# Patient Record
Sex: FEMALE | Race: WHITE | Employment: FULL TIME | URBAN - METROPOLITAN AREA
[De-identification: names, ages, dates, MRNs, and addresses within clinical notes are randomized per-mention and may not be internally consistent; named-entity substitution may affect disease eponyms.]

---

## 2018-07-11 ENCOUNTER — OFFICE VISIT (OUTPATIENT)
Dept: SURGERY | Age: 28
End: 2018-07-11

## 2018-07-11 VITALS
WEIGHT: 293 LBS | BODY MASS INDEX: 48.82 KG/M2 | HEART RATE: 72 BPM | RESPIRATION RATE: 18 BRPM | TEMPERATURE: 98.1 F | OXYGEN SATURATION: 97 % | HEIGHT: 65 IN

## 2018-07-11 DIAGNOSIS — K80.20 GALLSTONES: Primary | ICD-10-CM

## 2018-07-11 RX ORDER — CEFAZOLIN SODIUM 1 G/3ML
2 INJECTION, POWDER, FOR SOLUTION INTRAMUSCULAR; INTRAVENOUS
Status: CANCELLED | OUTPATIENT
Start: 2018-07-11 | End: 2018-07-11

## 2018-07-11 RX ORDER — OXYCODONE AND ACETAMINOPHEN 5; 325 MG/1; MG/1
TABLET ORAL
COMMUNITY

## 2018-07-11 NOTE — PERIOP NOTES
[facility] Preoperative Instructions Surgery Date 7/12/18          Time of Arrival 1215 1. On the day of your surgery, please report to the Surgical Services Registration Desk and sign in at your designated time. The Surgery Center is located to the right of the Emergency Room. 2. You must have someone with you to drive you home. You should not drive a car for 24 hours following surgery. Please make arrangements for a friend or family member to stay with you for the first 24 hours after your surgery. 3. Do not have anything to eat or drink (including water, gum, mints, coffee, juice) after midnight 7/11/18?? Ej Bonilla ? This may not apply to medications prescribed by your physician. ?(Please note below the special instructions with medications to take the morning of your procedure.) 4. We recommend you do not drink any alcoholic beverages for 24 hours before and after your surgery. 5. Contact your surgeons office for instructions on the following medications: non-steroidal anti-inflammatory drugs (i.e. Advil, Aleve), vitamins, and supplements. (Some surgeons will want you to stop these medications prior to surgery and others may allow you to take them) **If you are currently taking Plavix, Coumadin, Aspirin and/or other blood-thinning agents, contact your surgeon for instructions. ** Your surgeon will partner with the physician prescribing these medications to determine if it is safe to stop or if you need to continue taking. Please do not stop taking these medications without instructions from your surgeon 6. Wear comfortable clothes. Wear glasses instead of contacts. Do not bring any money or jewelry. Please bring picture ID, insurance card, and any prearranged co-payment or hospital payment. Do not wear make-up, particularly mascara the morning of your surgery. Do not wear nail polish, particularly if you are having foot /hand surgery.   Wear your hair loose or down, no ponytails, buns, calvin pins or clips. All body piercings must be removed. Please shower with antibacterial soap for three consecutive days before and on the morning of surgery, but do not apply any lotions, powders or deodorants after the shower on the day of surgery. Please use a fresh towels after each shower. Please sleep in clean clothes and change bed linens the night before surgery. Please do not shave for 48 hours prior to surgery. Shaving of the face is acceptable. 7. You should understand that if you do not follow these instructions your surgery may be cancelled. If your physical condition changes (I.e. fever, cold or flu) please contact your surgeon as soon as possible. 8. It is important that you be on time. If a situation occurs where you may be late, please call [phone] (OR Holding Area). 9. If you have any questions and or problems, please call [phone] (Pre-admission Testing). 10. Your surgery time may be subject to change. You will receive a phone call the evening prior if your time changes. 11.  If having outpatient surgery, you must have someone to drive you here, stay with you during the duration of your stay, and to drive you home at time of discharge. 12.   In an effort to improve the efficiency, privacy, and safety for all of our Pre-op patients visitors are not allowed in the Holding area. Once you arrive and are registered your family/visitors will be asked to remain in the waiting room. The Pre-op staff will get you from the Surgical Waiting Area and will explain to you and your family/visitors that the Pre-op phase is beginning. The staff will answer any questions and provide instructions for tracking of the patient, by use of the existing tracking number and color-coded status board in the waiting room.   At this time the staff will also ask for your designated spokesperson information in the event that the physician or staff need to provide an update or obtain any pertinent information. The designated spokesperson will be notified if the physician needs to speak to family during the pre-operative phase. If at any time your family/visitors has questions or concerns they may approach the volunteer desk in the waiting area for assistance. Special Instructions:none MEDICATIONS TO TAKE THE MORNING OF SURGERY WITH A SIP OF WATER:percocet if needed. I understand a pre-operative phone call will be made to verify my surgery time. In the event that I am not available, I give permission for a message to be left on my answering service and/or with another person? {yes @ [phone] 
 
 
 
 ___________________      __________   _________ 
  (Signature of Patient)             (Witness)                (Date and Time)

## 2018-07-11 NOTE — PROGRESS NOTES
Rinku King is a 34 y.o. female    Chief Complaint   Patient presents with    New Patient     Roseline NENITA     1. Have you been to the ER, urgent care clinic since your last visit? Hospitalized since your last visit? Yes Where: [address] [facility]    2. Have you seen or consulted any other health care providers outside of the [facility] since your last visit? Include any pap smears or colon screening.  No

## 2018-07-11 NOTE — MR AVS SNAPSHOT
[address], [address] [facility], [address] [place] [address] [facility] 
[phone] Patient: Denita Alvarenga MRN: [id number] TBL:[id number] Visit Information Date & Time Provider Department Dept. Phone Encounter #  
 7/11/2018 10:40 AM Lyssa Strange MD [facility] [id number] Your Appointments 7/30/2018  3:00 PM  
POST OP with Lyssa Strange MD  
[facility] Patrick Mosley [facility] ([address]) Appt Note: po- lap carol 7/16/18  
 [address] [facility], [address] [facility], [address] [facility] [address] [address]  
[address], [address] [facility], [address] [place] [address] [address] [address] Upcoming Health Maintenance Date Due  
 HPV Age 9Y-34Y (1 of 3 - Female 3 Dose Series) 1/10/2003 DTaP/Tdap/Td series (1 - Tdap) 1/10/2013 PAP AKA CERVICAL CYTOLOGY 1/10/2013 Influenza Age 5 to Adult 8/1/2018 Allergies as of 7/11/2018  Review Complete On: 7/11/2018 By: David Acosta RN Severity Noted Reaction Type Reactions Sulfa (Sulfonamide Antibiotics) High 07/11/2018    Anaphylaxis Current Immunizations  Never Reviewed No immunizations on file. Not reviewed this visit You Were Diagnosed With   
  
 Codes Comments Gallstones    -  Primary ICD-10-CM: O10.67 ICD-9-CM: 574.20 Vitals Pulse Temp Resp Height(growth percentile) Weight(growth percentile) SpO2  
 72 98.1 °F (36.7 °C) (Oral) 18 5' 5\" (1.651 m) 296 lb (134.3 kg) 97% BMI OB Status Smoking Status 49.26 kg/m2 Implant Never Smoker Vitals History BMI and BSA Data Body Mass Index Body Surface Area  
 49.26 kg/m 2 2.48 m 2 Your Updated Medication List  
  
   
This list is accurate as of 7/11/18  2:24 PM.  Always use your most recent med list.  
  
  
  
  
 PERCOCET 5-325 mg per tablet Generic drug:  oxyCODONE-acetaminophen Take  by mouth every four (4) hours as needed for Pain. Introducing [facility]! Jluis Cochran introduces Pict patient portal. Now you can access parts of your medical record, email your doctor's office, and request medication refills online. 1. In your internet browser, go to [email or web address] 2. Click on the First Time User? Click Here link in the Sign In box. You will see the New Member Sign Up page. 3. Enter your Pict Access Code exactly as it appears below. You will not need to use this code after youve completed the sign-up process. If you do not sign up before the expiration date, you must request a new code. · Pict Access Code: [email or web address] Expires: 10/9/2018 10:33 AM 
 
4. Enter the last four digits of your Social Security Number (xxxx) and Date of Birth (mm/dd/yyyy) as indicated and click Submit. You will be taken to the next sign-up page. 5. Create a Pict ID. This will be your Pict login ID and cannot be changed, so think of one that is secure and easy to remember. 6. Create a Pict password. You can change your password at any time. 7. Enter your Password Reset Question and Answer. This can be used at a later time if you forget your password. 8. Enter your e-mail address. You will receive e-mail notification when new information is available in [address] 9. Click Sign Up. You can now view and download portions of your medical record. 10. Click the Download Summary menu link to download a portable copy of your medical information. If you have questions, please visit the Frequently Asked Questions section of the Pict website. Remember, Pict is NOT to be used for urgent needs. For medical emergencies, dial 911. Now available from your iPhone and Android! Please provide this summary of care documentation to your next provider. Your primary care clinician is listed as Lashonda Costa.  If you have any questions after today's visit, please call [phone].

## 2018-07-11 NOTE — PROGRESS NOTES
Surgery Consult:  gallstones  Requesting physician:  Self referred    Subjective:   Patient 34 y.o.  female presents with 2 day history of constant \"pressure\" like epigastric pain radiating to the back with nausea and vomiting. No obvious palliative factors, but reports that her pain is worse with eating. No F/C/S. Patient was seen at [address] [place] [address] and underwent US which showed cholelithiasis with positive Chaves's sign. Labs were done but currently not available. No history of jaundice or pancreatitis. No prior history of biliary colic. Patient reports having constipation recently. Past Medical & Surgical History:  History reviewed. No pertinent past medical history. History reviewed. No pertinent surgical history. Social History:  Social History     Social History    Marital status: SINGLE     Spouse name: N/A    Number of children: N/A    Years of education: N/A     Occupational History    Not on file. Social History Main Topics    Smoking status: Never Smoker    Smokeless tobacco: Never Used    Alcohol use No    Drug use: No    Sexual activity: No     Other Topics Concern    Not on file     Social History Narrative    No narrative on file        Family History:  Family History   Problem Relation Age of Onset    Lupus Mother     Hypertension Father     Diabetes Father         Medications:  Current Outpatient Prescriptions   Medication Sig    oxyCODONE-acetaminophen (PERCOCET) 5-325 mg per tablet Take  by mouth every four (4) hours as needed for Pain. No current facility-administered medications for this visit. Allergies: Allergies   Allergen Reactions    Sulfa (Sulfonamide Antibiotics) Anaphylaxis       Review of Systems  A comprehensive review of systems was negative except for that written in the HPI.     Objective:     Exam:    Visit Vitals    Pulse 72    Temp 98.1 °F (36.7 °C) (Oral)    Resp 18    Ht 5' 5\" (1.651 m)    Wt 134.3 kg (296 lb)    SpO2 97%    BMI 49.26 kg/m2     General appearance: alert, cooperative, no distress, appears stated age  Eyes: no sclera icterus  Lungs: clear to auscultation bilaterally  Heart: regular rate and rhythm  Abdomen: soft, non-distended. Mild RUQ tenderness. No rebound or guarding. Extremities: extremities normal, atraumatic, no cyanosis or edema. PASCALE. Skin: Skin color, texture, turgor normal. No rashes or lesions. No jaundice. Neurologic: Grossly normal    Assessment:     Gallstones   Morbid obesity (BMI 49)    Plan:     Laparoscopic cholecystectomy/IOC. Risks, benefit, and alternative to surgery was discussed with the patient. The risks of surgery include but not limited to infection, bleeding, intraabdominal organ injury, bile duct injury, retained stone, bile leak, possible conversion to open, and the risks of general anesthetic. Patient is agreeable to surgery. All questions answered.

## 2018-07-12 ENCOUNTER — ANESTHESIA EVENT (OUTPATIENT)
Dept: SURGERY | Age: 28
End: 2018-07-12
Payer: COMMERCIAL

## 2018-07-12 ENCOUNTER — HOSPITAL ENCOUNTER (OUTPATIENT)
Age: 28
Setting detail: OUTPATIENT SURGERY
Discharge: HOME | End: 2018-07-12
Attending: SURGERY | Admitting: SURGERY
Payer: COMMERCIAL

## 2018-07-12 ENCOUNTER — ANESTHESIA (OUTPATIENT)
Dept: SURGERY | Age: 28
End: 2018-07-12
Payer: COMMERCIAL

## 2018-07-12 ENCOUNTER — APPOINTMENT (OUTPATIENT)
Dept: GENERAL RADIOLOGY | Age: 28
End: 2018-07-12
Attending: SURGERY
Payer: COMMERCIAL

## 2018-07-12 VITALS
DIASTOLIC BLOOD PRESSURE: 79 MMHG | TEMPERATURE: 97.8 F | RESPIRATION RATE: 16 BRPM | BODY MASS INDEX: 48.82 KG/M2 | OXYGEN SATURATION: 96 % | SYSTOLIC BLOOD PRESSURE: 111 MMHG | HEART RATE: 83 BPM | WEIGHT: 293 LBS | HEIGHT: 65 IN

## 2018-07-12 DIAGNOSIS — K80.20 GALLSTONES: Primary | ICD-10-CM

## 2018-07-12 LAB — HCG UR QL: NEGATIVE

## 2018-07-12 PROCEDURE — 77030032490 HC SLV COMPR SCD KNE COVD -B: Performed by: SURGERY

## 2018-07-12 PROCEDURE — 74300 X-RAY BILE DUCTS/PANCREAS: CPT

## 2018-07-12 PROCEDURE — 74011250636 HC RX REV CODE- 250/636: Performed by: SURGERY

## 2018-07-12 PROCEDURE — 77030018390 HC SPNG HEMSTAT2 J&J -B: Performed by: SURGERY

## 2018-07-12 PROCEDURE — 77030035045 HC TRCR ENDOSC VRSPRT BLDLSS COVD -B: Performed by: SURGERY

## 2018-07-12 PROCEDURE — 77030008771 HC TU NG SALEM SUMP -A: Performed by: NURSE ANESTHETIST, CERTIFIED REGISTERED

## 2018-07-12 PROCEDURE — 77030037892: Performed by: SURGERY

## 2018-07-12 PROCEDURE — 77030026438 HC STYL ET INTUB CARD -A: Performed by: NURSE ANESTHETIST, CERTIFIED REGISTERED

## 2018-07-12 PROCEDURE — 81025 URINE PREGNANCY TEST: CPT

## 2018-07-12 PROCEDURE — 74011636320 HC RX REV CODE- 636/320: Performed by: SURGERY

## 2018-07-12 PROCEDURE — 77030011640 HC PAD GRND REM COVD -A: Performed by: SURGERY

## 2018-07-12 PROCEDURE — 74011250637 HC RX REV CODE- 250/637: Performed by: SURGERY

## 2018-07-12 PROCEDURE — 77030008684 HC TU ET CUF COVD -B: Performed by: NURSE ANESTHETIST, CERTIFIED REGISTERED

## 2018-07-12 PROCEDURE — 77030031139 HC SUT VCRL2 J&J -A: Performed by: SURGERY

## 2018-07-12 PROCEDURE — 77030035048 HC TRCR ENDOSC OPTCL COVD -B: Performed by: SURGERY

## 2018-07-12 PROCEDURE — 77030020782 HC GWN BAIR PAWS FLX 3M -B

## 2018-07-12 PROCEDURE — 76010000149 HC OR TIME 1 TO 1.5 HR: Performed by: SURGERY

## 2018-07-12 PROCEDURE — 76210000016 HC OR PH I REC 1 TO 1.5 HR: Performed by: SURGERY

## 2018-07-12 PROCEDURE — 88304 TISSUE EXAM BY PATHOLOGIST: CPT | Performed by: SURGERY

## 2018-07-12 PROCEDURE — 77030002895 HC DEV VASC CLOSR COVD -B: Performed by: SURGERY

## 2018-07-12 PROCEDURE — 77030008756 HC TU IRR SUC STRY -B: Performed by: SURGERY

## 2018-07-12 PROCEDURE — 74011250636 HC RX REV CODE- 250/636: Performed by: ANESTHESIOLOGY

## 2018-07-12 PROCEDURE — 77030020263 HC SOL INJ SOD CL0.9% LFCR 1000ML: Performed by: SURGERY

## 2018-07-12 PROCEDURE — 74011000250 HC RX REV CODE- 250: Performed by: SURGERY

## 2018-07-12 PROCEDURE — 77030035051: Performed by: SURGERY

## 2018-07-12 PROCEDURE — 77030020053 HC ELECTRD LAPSCP COVD -B: Performed by: SURGERY

## 2018-07-12 PROCEDURE — 74011250636 HC RX REV CODE- 250/636

## 2018-07-12 PROCEDURE — 74011000250 HC RX REV CODE- 250

## 2018-07-12 PROCEDURE — 76060000033 HC ANESTHESIA 1 TO 1.5 HR: Performed by: SURGERY

## 2018-07-12 PROCEDURE — C1758 CATHETER, URETERAL: HCPCS | Performed by: SURGERY

## 2018-07-12 PROCEDURE — 76210000020 HC REC RM PH II FIRST 0.5 HR: Performed by: SURGERY

## 2018-07-12 PROCEDURE — 77030012022 HC APPL CLP ENDOSC COVD -C: Performed by: SURGERY

## 2018-07-12 PROCEDURE — 77030019908 HC STETH ESOPH SIMS -A: Performed by: NURSE ANESTHETIST, CERTIFIED REGISTERED

## 2018-07-12 PROCEDURE — 77030039266 HC ADH SKN EXOFIN S2SG -A: Performed by: SURGERY

## 2018-07-12 PROCEDURE — 77030032435

## 2018-07-12 RX ORDER — MIDAZOLAM HYDROCHLORIDE 1 MG/ML
0.5 INJECTION, SOLUTION INTRAMUSCULAR; INTRAVENOUS
Status: DISCONTINUED | OUTPATIENT
Start: 2018-07-12 | End: 2018-07-12 | Stop reason: HOSPADM

## 2018-07-12 RX ORDER — LIDOCAINE HYDROCHLORIDE 20 MG/ML
INJECTION, SOLUTION EPIDURAL; INFILTRATION; INTRACAUDAL; PERINEURAL AS NEEDED
Status: DISCONTINUED | OUTPATIENT
Start: 2018-07-12 | End: 2018-07-12 | Stop reason: HOSPADM

## 2018-07-12 RX ORDER — NEOSTIGMINE METHYLSULFATE 1 MG/ML
INJECTION INTRAVENOUS AS NEEDED
Status: DISCONTINUED | OUTPATIENT
Start: 2018-07-12 | End: 2018-07-12 | Stop reason: HOSPADM

## 2018-07-12 RX ORDER — DIPHENHYDRAMINE HYDROCHLORIDE 50 MG/ML
12.5 INJECTION, SOLUTION INTRAMUSCULAR; INTRAVENOUS AS NEEDED
Status: DISCONTINUED | OUTPATIENT
Start: 2018-07-12 | End: 2018-07-12 | Stop reason: HOSPADM

## 2018-07-12 RX ORDER — HEPARIN SODIUM 5000 [USP'U]/ML
5000 INJECTION, SOLUTION INTRAVENOUS; SUBCUTANEOUS
Status: COMPLETED | OUTPATIENT
Start: 2018-07-12 | End: 2018-07-12

## 2018-07-12 RX ORDER — LIDOCAINE HYDROCHLORIDE 10 MG/ML
0.1 INJECTION, SOLUTION EPIDURAL; INFILTRATION; INTRACAUDAL; PERINEURAL AS NEEDED
Status: DISCONTINUED | OUTPATIENT
Start: 2018-07-12 | End: 2018-07-12 | Stop reason: HOSPADM

## 2018-07-12 RX ORDER — PROPOFOL 10 MG/ML
INJECTION, EMULSION INTRAVENOUS AS NEEDED
Status: DISCONTINUED | OUTPATIENT
Start: 2018-07-12 | End: 2018-07-12 | Stop reason: HOSPADM

## 2018-07-12 RX ORDER — ACETAMINOPHEN 10 MG/ML
INJECTION, SOLUTION INTRAVENOUS AS NEEDED
Status: DISCONTINUED | OUTPATIENT
Start: 2018-07-12 | End: 2018-07-12 | Stop reason: HOSPADM

## 2018-07-12 RX ORDER — FENTANYL CITRATE 50 UG/ML
INJECTION, SOLUTION INTRAMUSCULAR; INTRAVENOUS AS NEEDED
Status: DISCONTINUED | OUTPATIENT
Start: 2018-07-12 | End: 2018-07-12 | Stop reason: HOSPADM

## 2018-07-12 RX ORDER — FENTANYL CITRATE 50 UG/ML
50 INJECTION, SOLUTION INTRAMUSCULAR; INTRAVENOUS AS NEEDED
Status: DISCONTINUED | OUTPATIENT
Start: 2018-07-12 | End: 2018-07-12 | Stop reason: HOSPADM

## 2018-07-12 RX ORDER — SODIUM CHLORIDE, SODIUM LACTATE, POTASSIUM CHLORIDE, CALCIUM CHLORIDE 600; 310; 30; 20 MG/100ML; MG/100ML; MG/100ML; MG/100ML
25 INJECTION, SOLUTION INTRAVENOUS CONTINUOUS
Status: DISCONTINUED | OUTPATIENT
Start: 2018-07-12 | End: 2018-07-12 | Stop reason: HOSPADM

## 2018-07-12 RX ORDER — OXYCODONE AND ACETAMINOPHEN 5; 325 MG/1; MG/1
2 TABLET ORAL ONCE
Status: COMPLETED | OUTPATIENT
Start: 2018-07-12 | End: 2018-07-12

## 2018-07-12 RX ORDER — ONDANSETRON 2 MG/ML
4 INJECTION INTRAMUSCULAR; INTRAVENOUS AS NEEDED
Status: DISCONTINUED | OUTPATIENT
Start: 2018-07-12 | End: 2018-07-12 | Stop reason: HOSPADM

## 2018-07-12 RX ORDER — HYDROMORPHONE HYDROCHLORIDE 2 MG/ML
INJECTION, SOLUTION INTRAMUSCULAR; INTRAVENOUS; SUBCUTANEOUS AS NEEDED
Status: DISCONTINUED | OUTPATIENT
Start: 2018-07-12 | End: 2018-07-12 | Stop reason: HOSPADM

## 2018-07-12 RX ORDER — SODIUM CHLORIDE 0.9 % (FLUSH) 0.9 %
5-10 SYRINGE (ML) INJECTION AS NEEDED
Status: DISCONTINUED | OUTPATIENT
Start: 2018-07-12 | End: 2018-07-12 | Stop reason: HOSPADM

## 2018-07-12 RX ORDER — ONDANSETRON 2 MG/ML
INJECTION INTRAMUSCULAR; INTRAVENOUS AS NEEDED
Status: DISCONTINUED | OUTPATIENT
Start: 2018-07-12 | End: 2018-07-12 | Stop reason: HOSPADM

## 2018-07-12 RX ORDER — DEXAMETHASONE SODIUM PHOSPHATE 4 MG/ML
INJECTION, SOLUTION INTRA-ARTICULAR; INTRALESIONAL; INTRAMUSCULAR; INTRAVENOUS; SOFT TISSUE AS NEEDED
Status: DISCONTINUED | OUTPATIENT
Start: 2018-07-12 | End: 2018-07-12 | Stop reason: HOSPADM

## 2018-07-12 RX ORDER — ROCURONIUM BROMIDE 10 MG/ML
INJECTION, SOLUTION INTRAVENOUS AS NEEDED
Status: DISCONTINUED | OUTPATIENT
Start: 2018-07-12 | End: 2018-07-12 | Stop reason: HOSPADM

## 2018-07-12 RX ORDER — MORPHINE SULFATE 10 MG/ML
2 INJECTION, SOLUTION INTRAMUSCULAR; INTRAVENOUS
Status: DISCONTINUED | OUTPATIENT
Start: 2018-07-12 | End: 2018-07-12 | Stop reason: HOSPADM

## 2018-07-12 RX ORDER — SUCCINYLCHOLINE CHLORIDE 20 MG/ML
INJECTION INTRAMUSCULAR; INTRAVENOUS AS NEEDED
Status: DISCONTINUED | OUTPATIENT
Start: 2018-07-12 | End: 2018-07-12 | Stop reason: HOSPADM

## 2018-07-12 RX ORDER — MIDAZOLAM HYDROCHLORIDE 1 MG/ML
INJECTION, SOLUTION INTRAMUSCULAR; INTRAVENOUS AS NEEDED
Status: DISCONTINUED | OUTPATIENT
Start: 2018-07-12 | End: 2018-07-12 | Stop reason: HOSPADM

## 2018-07-12 RX ORDER — OXYCODONE AND ACETAMINOPHEN 5; 325 MG/1; MG/1
1-2 TABLET ORAL
Qty: 50 TAB | Refills: 0 | Status: SHIPPED | OUTPATIENT
Start: 2018-07-12

## 2018-07-12 RX ORDER — HYDROMORPHONE HYDROCHLORIDE 1 MG/ML
.2-.5 INJECTION, SOLUTION INTRAMUSCULAR; INTRAVENOUS; SUBCUTANEOUS
Status: DISCONTINUED | OUTPATIENT
Start: 2018-07-12 | End: 2018-07-12 | Stop reason: HOSPADM

## 2018-07-12 RX ORDER — ONDANSETRON 4 MG/1
4 TABLET, FILM COATED ORAL
Qty: 20 TAB | Refills: 0 | Status: SHIPPED | OUTPATIENT
Start: 2018-07-12

## 2018-07-12 RX ORDER — FENTANYL CITRATE 50 UG/ML
25 INJECTION, SOLUTION INTRAMUSCULAR; INTRAVENOUS
Status: DISCONTINUED | OUTPATIENT
Start: 2018-07-12 | End: 2018-07-12 | Stop reason: HOSPADM

## 2018-07-12 RX ORDER — GLYCOPYRROLATE 0.2 MG/ML
INJECTION INTRAMUSCULAR; INTRAVENOUS AS NEEDED
Status: DISCONTINUED | OUTPATIENT
Start: 2018-07-12 | End: 2018-07-12 | Stop reason: HOSPADM

## 2018-07-12 RX ORDER — BUPIVACAINE HYDROCHLORIDE AND EPINEPHRINE 2.5; 5 MG/ML; UG/ML
INJECTION, SOLUTION EPIDURAL; INFILTRATION; INTRACAUDAL; PERINEURAL AS NEEDED
Status: DISCONTINUED | OUTPATIENT
Start: 2018-07-12 | End: 2018-07-12 | Stop reason: HOSPADM

## 2018-07-12 RX ADMIN — SUCCINYLCHOLINE CHLORIDE 200 MG: 20 INJECTION INTRAMUSCULAR; INTRAVENOUS at 12:16

## 2018-07-12 RX ADMIN — OXYCODONE HYDROCHLORIDE AND ACETAMINOPHEN 2 TABLET: 5; 325 TABLET ORAL at 14:30

## 2018-07-12 RX ADMIN — ROCURONIUM BROMIDE 25 MG: 10 INJECTION, SOLUTION INTRAVENOUS at 12:27

## 2018-07-12 RX ADMIN — FENTANYL CITRATE 25 MCG: 50 INJECTION, SOLUTION INTRAMUSCULAR; INTRAVENOUS at 14:00

## 2018-07-12 RX ADMIN — FENTANYL CITRATE 25 MCG: 50 INJECTION, SOLUTION INTRAMUSCULAR; INTRAVENOUS at 14:05

## 2018-07-12 RX ADMIN — FENTANYL CITRATE 25 MCG: 50 INJECTION, SOLUTION INTRAMUSCULAR; INTRAVENOUS at 13:20

## 2018-07-12 RX ADMIN — ONDANSETRON 4 MG: 2 INJECTION INTRAMUSCULAR; INTRAVENOUS at 13:10

## 2018-07-12 RX ADMIN — FENTANYL CITRATE 100 MCG: 50 INJECTION, SOLUTION INTRAMUSCULAR; INTRAVENOUS at 12:16

## 2018-07-12 RX ADMIN — NEOSTIGMINE METHYLSULFATE 4 MG: 1 INJECTION INTRAVENOUS at 13:15

## 2018-07-12 RX ADMIN — FENTANYL CITRATE 50 MCG: 50 INJECTION, SOLUTION INTRAMUSCULAR; INTRAVENOUS at 13:05

## 2018-07-12 RX ADMIN — CEFAZOLIN 3 G: 1 INJECTION, POWDER, FOR SOLUTION INTRAMUSCULAR; INTRAVENOUS; PARENTERAL at 12:23

## 2018-07-12 RX ADMIN — SODIUM CHLORIDE, POTASSIUM CHLORIDE, SODIUM LACTATE AND CALCIUM CHLORIDE: 600; 310; 30; 20 INJECTION, SOLUTION INTRAVENOUS at 13:06

## 2018-07-12 RX ADMIN — FENTANYL CITRATE 25 MCG: 50 INJECTION, SOLUTION INTRAMUSCULAR; INTRAVENOUS at 13:50

## 2018-07-12 RX ADMIN — HYDROMORPHONE HYDROCHLORIDE 1 MG: 2 INJECTION, SOLUTION INTRAMUSCULAR; INTRAVENOUS; SUBCUTANEOUS at 12:32

## 2018-07-12 RX ADMIN — DEXAMETHASONE SODIUM PHOSPHATE 8 MG: 4 INJECTION, SOLUTION INTRA-ARTICULAR; INTRALESIONAL; INTRAMUSCULAR; INTRAVENOUS; SOFT TISSUE at 12:28

## 2018-07-12 RX ADMIN — GLYCOPYRROLATE 0.6 MG: 0.2 INJECTION INTRAMUSCULAR; INTRAVENOUS at 13:15

## 2018-07-12 RX ADMIN — MIDAZOLAM HYDROCHLORIDE 2 MG: 1 INJECTION, SOLUTION INTRAMUSCULAR; INTRAVENOUS at 12:09

## 2018-07-12 RX ADMIN — PROPOFOL 250 MG: 10 INJECTION, EMULSION INTRAVENOUS at 12:16

## 2018-07-12 RX ADMIN — HEPARIN SODIUM 5000 UNITS: 5000 INJECTION, SOLUTION INTRAVENOUS; SUBCUTANEOUS at 11:13

## 2018-07-12 RX ADMIN — ACETAMINOPHEN 1000 MG: 10 INJECTION, SOLUTION INTRAVENOUS at 12:46

## 2018-07-12 RX ADMIN — SODIUM CHLORIDE, POTASSIUM CHLORIDE, SODIUM LACTATE AND CALCIUM CHLORIDE: 600; 310; 30; 20 INJECTION, SOLUTION INTRAVENOUS at 11:30

## 2018-07-12 RX ADMIN — ROCURONIUM BROMIDE 5 MG: 10 INJECTION, SOLUTION INTRAVENOUS at 12:16

## 2018-07-12 RX ADMIN — LIDOCAINE HYDROCHLORIDE 60 MG: 20 INJECTION, SOLUTION EPIDURAL; INFILTRATION; INTRACAUDAL; PERINEURAL at 12:16

## 2018-07-12 RX ADMIN — ONDANSETRON 4 MG: 2 INJECTION INTRAMUSCULAR; INTRAVENOUS at 13:50

## 2018-07-12 RX ADMIN — FENTANYL CITRATE 25 MCG: 50 INJECTION, SOLUTION INTRAMUSCULAR; INTRAVENOUS at 13:55

## 2018-07-12 RX ADMIN — FENTANYL CITRATE 50 MCG: 50 INJECTION, SOLUTION INTRAMUSCULAR; INTRAVENOUS at 13:17

## 2018-07-12 RX ADMIN — HYDROMORPHONE HYDROCHLORIDE 0.5 MG: 2 INJECTION, SOLUTION INTRAMUSCULAR; INTRAVENOUS; SUBCUTANEOUS at 13:17

## 2018-07-12 NOTE — INTERVAL H&P NOTE
H&P Update:  Linden Paredes was seen and examined. History and physical has been reviewed. The patient has been examined.  There have been no significant clinical changes since the completion of the originally dated History and Physical.    Signed By: Ventura Olmedo MD     July 12, 2018 10:39 AM

## 2018-07-12 NOTE — PERIOP NOTES
1500: Pt. For d/c today. Pt. States her  Pain level 5, medicated in pacu prior to coming to phase 2. Incision sites x 4 on abdomin D/C/I, covered with derma bond. Reviewed d/c instructions, Rx., & F/U care with pt. IV d/c'd and pt. Discharged home.

## 2018-07-12 NOTE — PERIOP NOTES
Family updated at 82 by Alva Hinds. Information provided to the patient's father, Benoitjamila Liriano. There were no questions or concerns expressed at the time of the update.

## 2018-07-12 NOTE — DISCHARGE INSTRUCTIONS
Patient Discharge Instructions    Antionette Dalia / [id number] : 1990    Admitted 2018 Discharged: 2018         What to do at Home    Recommended diet: Low fat, Low cholesterol    Recommended activity: no heavy lifting > 20 lbs x 2 weeks; no driving while taking narcotics for pain. May take shower, but no bath x 2 weeks. Keep ice over the incision to minimize swelling. Please take over the counter stool softener or miralax while taking narcotics for pain. If you experience a lot of drainage, develop redness around the wound, or a fever over 101 F occurs please call the office. Narcotics and anesthesia sometimes cause nausea and vomiting. If persistent please call the office. Do not hesitate to call with questions or concerns. Follow-up with Dr. Richy Mckeon in 2 weeks. Please call [phone] for an appointment. Information obtained by :  I understand that if any problems occur once I am at home I am to contact my physician. I understand and acknowledge receipt of the instructions indicated above.                                                                                                                                                Physician's or R.N.'s Signature                                                                  Date/Time                                                                                                                                              Patient or Representative Signature                                                          Date/Time        DISCHARGE SUMMARY from Nurse    PATIENT INSTRUCTIONS:    After general anesthesia or intravenous sedation, for 24 hours or while taking prescription Narcotics:  · Limit your activities  · Do not drive and operate hazardous machinery  · Do not make important personal or business decisions  · Do  not drink alcoholic beverages  · If you have not urinated within 8 hours after discharge, please contact your surgeon on call. Report the following to your surgeon:  · Excessive pain, swelling, redness or odor of or around the surgical area  · Temperature over 100.5  · Nausea and vomiting lasting longer than 4 hours or if unable to take medications  · Any signs of decreased circulation or nerve impairment to extremity: change in color, persistent  numbness, tingling, coldness or increase pain  · Any questions    What to do at Home:  *  Please give a list of your current medications to your Primary Care Provider. *  Please update this list whenever your medications are discontinued, doses are      changed, or new medications (including over-the-counter products) are added. *  Please carry medication information at all times in case of emergency situations. These are general instructions for a healthy lifestyle:    No smoking/ No tobacco products/ Avoid exposure to second hand smoke  Surgeon General's Warning:  Quitting smoking now greatly reduces serious risk to your health. Obesity, smoking, and sedentary lifestyle greatly increases your risk for illness    A healthy diet, regular physical exercise & weight monitoring are important for maintaining a healthy lifestyle    You may be retaining fluid if you have a history of heart failure or if you experience any of the following symptoms:  Weight gain of 3 pounds or more overnight or 5 pounds in a week, increased swelling in our hands or feet or shortness of breath while lying flat in bed. Please call your doctor as soon as you notice any of these symptoms; do not wait until your next office visit. Recognize signs and symptoms of STROKE:    F-face looks uneven    A-arms unable to move or move unevenly    S-speech slurred or non-existent    T-time-call 911 as soon as signs and symptoms begin-DO NOT go       Back to bed or wait to see if you get better-TIME IS BRAIN.     Warning Signs of HEART ATTACK     Call 911 if you have these symptoms:   Chest discomfort. Most heart attacks involve discomfort in the center of the chest that lasts more than a few minutes, or that goes away and comes back. It can feel like uncomfortable pressure, squeezing, fullness, or pain.  Discomfort in other areas of the upper body. Symptoms can include pain or discomfort in one or both arms, the back, neck, jaw, or stomach.  Shortness of breath with or without chest discomfort.  Other signs may include breaking out in a cold sweat, nausea, or lightheadedness. Don't wait more than five minutes to call 911 - MINUTES MATTER! Fast action can save your life. Calling 911 is almost always the fastest way to get lifesaving treatment. Emergency Medical Services staff can begin treatment when they arrive -- up to an hour sooner than if someone gets to the hospital by car. The discharge information has been reviewed with the patient and caregiver. The patient and caregiver verbalized understanding. Discharge medications reviewed with the patient and caregiver and appropriate educational materials and side effects teaching were provided. ___________________________________________________________________________________________________________________________________    A common side effect of anesthesia following surgery is nausea and/or vomiting. In order to decrease symptoms, it is wise to avoid foods that are high in fat, greasy foods, milk products, and spicy foods for the first 24 hours. Acceptable foods for the first 24 hours following surgery include but are not limited to:     soup   broth    toast    crackers    applesauce    bananas    mashed potatoes,   soft or scrambled eggs   oatmeal    jello    It is important to eat when taking your pain medication. This will help to prevent nausea. If possible, please try to time your meals with your medications. It is very important to stay hydrated following surgery.  Sip fluids frequently while awake. Avoid acidic drinks such as citrus juices and soda for 24 hours. Carbonated beverages may cause bloating and gas. Acceptable fluids include:    - water (flavor packets may add variety)  - coffee or tea (in moderation)  - Gatorade  - Toni-aid  - apple juice  - cranberry juice    You are encouraged to cough and deep breathe every hour when awake. This will help to prevent respiratory complications following anesthesia. You may want to hug a pillow when coughing and sneezing to add additional support to the surgical area and to decrease discomfort if you had abdominal or chest surgery. If you are discharged home with support stockings, you may remove them after 24 hours. Support stockings are used to help prevent blood clots in the legs following surgery. Please take time to review all of your Home Care Instructions and Medication Information sheets provided in your discharge packet. If you have any questions, please contact your surgeons office. Thank you. How to Care for Your Wound After Its Treated With  DERMABOND* Topical Skin Adhesive  DERMABOND* Topical Skin Adhesive (2-octyl cyanoacrylate) is a sterile, liquid skin adhesive  that holds wound edges together. The film will usually remain in place for 5 to 10 days, then  naturally fall off your skin. The following will answer some of your questions and provide instructions for proper care for your  wound while it is healing:    CHECK WOUND APPEARANCE   Some swelling, redness, and pain are common with all wounds and normally will go away as the  wound heals. If swelling, redness, or pain increases or if the wound feels warm to the touch,  contact a doctor. Also contact a doctor if the wound edges reopen or separate. REPLACE BANDAGES   If your wound is bandaged, keep the bandage dry.    Replace the dressing daily until the adhesive film has fallen off or if the  bandage should become wet, unless otherwise instructed by your  physician.  When changing the dressing, do not place tape directly over the  DERMABOND adhesive film, because removing the tape later may also  remove the film. AVOID TOPICAL MEDICATIONS   Do not apply liquid or ointment medications or any other product to your wound while the  DERMABOND adhesive film is in place. These may loosen the film before your wound is healed. KEEP WOUND DRY AND PROTECTED   You may occasionally and briefly wet your wound in the shower or bath. Do not soak or scrub  your wound, do not swim, and avoid periods of heavy perspiration until the DERMABOND  adhesive has naturally fallen off. After showering or bathing, gently blot your wound dry with a  soft towel. If a protective dressing is being used, apply a fresh, dry bandage, being sure to keep  the tape off the DERMABOND adhesive film.  Apply a clean, dry bandage over the wound if necessary to protect it.  Protect your wound from injury until the skin has had sufficient time to heal.   Do not scratch, rub, or pick at the DERMABOND adhesive film. This may loosen the film before  your wound is healed.  Protect the wound from prolonged exposure to sunlight or tanning lamps while the film is in  place. If you have any questions or concerns about this product, please consult your doctor. *Trademark ©[employer] 2002         Narcotic-Analgesic/Acetaminophen (Percocet, Vince Daniel, Lorcet HD, Lortab 10/325) - (By mouth)   Why this medicine is used:   Relieves pain.   Contact a nurse or doctor right away if you have:  · Extreme weakness, shallow breathing, slow heartbeat  · Severe confusion, lightheadedness, dizziness, fainting  · Yellow skin or eyes, dark urine or pale stools  · Severe constipation, severe stomach pain, nausea, vomiting, loss of appetite  · Sweating or cold, clammy skin     Common side effects:  · Mild constipation, nausea, vomiting  · Sleepiness, tiredness  · Itching, rash  © 2017 [facility] Information is for End User's use only and may not be sold, redistributed or otherwise used for commercial purposes. Ondansetron (Zofran, Zofran ODT, Zuplenz) - (By mouth, Into the mouth)   Why this medicine is used:   Prevents nausea and vomiting. Contact a nurse or doctor right away if you have:  · Fast, pounding, or uneven heartbeat  · Lightheadedness or fainting  · Trouble breathing     Common side effects:  · Headache, tiredness  · Constipation, diarrhea  © 2017 [address] Information is for End User's use only and may not be sold, redistributed or otherwise used for commercial purposes.

## 2018-07-12 NOTE — PERIOP NOTES
08:48= Dr Tha Lopes had a cancellation and wanted to see if pt could come in early; pt states she would like to take a shower 1st and she lives about an hour away but can be here by [address] [facility]. 09:52= called pt to get an ETA; states she's \"9 miles away. \" Soha Lam, [place] Charge, to update. Will tag time pt when she gets here. 10:28= informed [facility], that pt had arrived for surgery.

## 2018-07-12 NOTE — PERIOP NOTES
Handoff Report from Operating Room to PACU    Report received from GIOVANNA Shirley RN Intern and Eliane Sethi CRNA regarding Wilbern Or. Surgeon(s):  Tod Courtney MD  And Procedure(s) (LRB):  LAPAROSCOPIC CHOLECYSTECTOMY WITH GRAMS (N/A)  confirmed   with allergies and dressings discussed. Anesthesia type, drugs, patient history, complications, estimated blood loss, vital signs, intake and output, and last pain medication, lines, reversal medications and temperature were reviewed.

## 2018-07-12 NOTE — PERIOP NOTES
TRANSFER - OUT REPORT:    Verbal report given to Mo Kuo RN on Robel Paredes  being transferred to Phase II Recovery Unit for routine progression of care       Report consisted of patients Situation, Background, Assessment and   Recommendations(SBAR). Information from the following report(s) SBAR, Kardex, Procedure Summary, Intake/Output, MAR, Accordion, Recent Results and Cardiac Rhythm sinus rhythm was reviewed with the receiving nurse. Opportunity for questions and clarification was provided.       Patient transported with:   Registered Nurse   Patient chart and belongings

## 2018-07-12 NOTE — PERIOP NOTES
Notified Dr. Tanya Merrill and Dr. Shanae Carrillo that there is not any lab results in patient's chart, nor can patient urinate for UPT. Serum pregnancy test ordered, no other orders given. 1125 RN was able to remove 2 grey metal ear piercing's from right ear. Piercing's placed in small bag and then patient's belongings bag along with her clothing and eye glasses. 1140 up to bathroom; unable to void. 1200  Dr. Tanya Merrill in. Straight order given. See managed orders. 1209  Straight cathed for 50 cc dark yellow concentrated smelling urine. POC UPT ran and result is negative. Dr. Tanya Merrill and Dr. Shanae Carrillo notified.

## 2018-07-12 NOTE — ANESTHESIA POSTPROCEDURE EVALUATION
Post-Anesthesia Evaluation and Assessment    Patient: Geoff Gomez MRN: [id number]  SSN: [id number]    YOB: 1990  Age: 34 y.o. Sex: female       Cardiovascular Function/Vital Signs  Visit Vitals    /57 (BP 1 Location: Right arm, BP Patient Position: At rest)    Pulse (!) 58    Temp 36.9 °C (98.4 °F)    Resp 15    Ht 5' 5\" (1.651 m)    Wt 133 kg (293 lb 3.4 oz)    SpO2 97%    BMI 48.79 kg/m2       Patient is status post general anesthesia for Procedure(s):  LAPAROSCOPIC CHOLECYSTECTOMY WITH GRAMS. Nausea/Vomiting: None    Postoperative hydration reviewed and adequate. Pain:  Pain Scale 1: Numeric (0 - 10) (07/12/18 1430)  Pain Intensity 1: 4 (07/12/18 1430)   Managed    Neurological Status:   Neuro (WDL): Exceptions to WDL (07/12/18 1335)  Neuro  Neurologic State: Drowsy; Eyes open spontaneously; Eyes open to voice; Restless (07/12/18 1335)  Orientation Level: Oriented X4 (07/12/18 1335)  Cognition: Follows commands (07/12/18 1335)  Speech: Clear (07/12/18 1335)  LUE Motor Response: Purposeful (07/12/18 1335)  LLE Motor Response: Purposeful (07/12/18 1335)  RUE Motor Response: Purposeful (07/12/18 1335)  RLE Motor Response: Purposeful (07/12/18 1335)   At baseline    Mental Status and Level of Consciousness: Arousable    Pulmonary Status:   O2 Device: Room air (07/12/18 1430)   Adequate oxygenation and airway patent    Complications related to anesthesia: None    Post-anesthesia assessment completed.  No concerns    Signed By: Yadira Bradley MD     July 12, 2018

## 2018-07-12 NOTE — OP NOTES
Patient ID:   Name: Marco Escalera Record Number: [id number]   YOB: 1990    Date of Surgery: 7/12/2018      Preoperative Diagnosis:   Gallstones    Postoperative Diagnosis:  Gallstones    Procedures:  Laparoscopic cholecystectomy with intraoperative cholangiogram    Surgeon: Irvin Hashimoto, MD      Anesthesia: General    Findings:  No filling defect within the common bile duct    Estimated Blood Loss:  20cc    Specimens:   gallbladder    Indications:   Patient 34 y.o.  female presents with 2 day history of constant \"pressure\" like epigastric pain radiating to the back with nausea and vomiting. Patient was seen at [address] [place] [address] and underwent US which showed cholelithiasis with positive Chaves's sign. Patient presents today for cholecystectomy.     Procedure Details: The patient was seen in the Holding Room. The risks, benefits, complications, treatment options, and expected outcomes were discussed with the patient. After informed consent was performed, patient was taken to the operating room and was placed supine in the operating table. After establishing general anesthesia, abdomen was prepped and draped in standard surgical fashion. Small right subcostal incision was made and a 5 mm blunt trocar was placed under direct vision. CO2 pneumoperitoneum was then created. Additional 5 mm blunt trocar was placed supraumbilical and a 12 mm trocar was placed in the epigastric area. Abdomen was explored. Patient was found to have  adhesions along the right lateral inferior edge of the gallbladder. This was taken down carefully. Due to large size of the gallbladder and the liver, additional 5 mm trocar was placed in the right upper quadrant to better expose the base of the gallbladder. The base of the gallbladder was carefully dissected. Cystic duct was then identified and isolated.    A clip was applied at the base of the gallbladder and small incision was made within the cystic duct for the cholangiogram.  Cholangiogram catheter was placed and cholangiogram was performed. No filling defect was noted within the common bile duct. Cholangiogram catheter was removed and 3 clips were applied to the cystic duct on the patient side. Cystic duct was divided. Cystic artery was identified  and isolated. Cystic artery was then clipped x 3 and divided. Gallbladder was then removed off of the gallbladder fossa using electrocautery. Gallbladder was retrieved using the Endocatch bag through the periumbilical port site. Abdomen was irrigated. Good hemostasis was obtained. Surgicel was placed in the gallbladder fossa. Fascial defect in the periumbilical port was closed with interrupted figure of eight 0-0 Vicryl using Endoclose. Trocars were removed. CO2 pneumoperitoneum was released. Skin was then approximated using 4-0 Vicryl subcuticular stitch followed by Dermabond. Instrument, sponge, and needle counts were correct prior to closure and at the conclusion of the case. The patient was taken to recovery room in good condition having tolerated the procedure well.       CC:  Mason Akers NP

## 2018-07-12 NOTE — H&P (VIEW-ONLY)
Surgery Consult:  gallstones  Requesting physician:  Self referred    Subjective:   Patient 34 y.o.  female presents with 2 day history of constant \"pressure\" like epigastric pain radiating to the back with nausea and vomiting. No obvious palliative factors, but reports that her pain is worse with eating. No F/C/S. Patient was seen at [address] [place] [address] and underwent US which showed cholelithiasis with positive Chaves's sign. Labs were done but currently not available. No history of jaundice or pancreatitis. No prior history of biliary colic. Patient reports having constipation recently. Past Medical & Surgical History:  History reviewed. No pertinent past medical history. History reviewed. No pertinent surgical history. Social History:  Social History     Social History    Marital status: SINGLE     Spouse name: N/A    Number of children: N/A    Years of education: N/A     Occupational History    Not on file. Social History Main Topics    Smoking status: Never Smoker    Smokeless tobacco: Never Used    Alcohol use No    Drug use: No    Sexual activity: No     Other Topics Concern    Not on file     Social History Narrative    No narrative on file        Family History:  Family History   Problem Relation Age of Onset    Lupus Mother     Hypertension Father     Diabetes Father         Medications:  Current Outpatient Prescriptions   Medication Sig    oxyCODONE-acetaminophen (PERCOCET) 5-325 mg per tablet Take  by mouth every four (4) hours as needed for Pain. No current facility-administered medications for this visit. Allergies: Allergies   Allergen Reactions    Sulfa (Sulfonamide Antibiotics) Anaphylaxis       Review of Systems  A comprehensive review of systems was negative except for that written in the HPI.     Objective:     Exam:    Visit Vitals    Pulse 72    Temp 98.1 °F (36.7 °C) (Oral)    Resp 18    Ht 5' 5\" (1.651 m)    Wt 134.3 kg (296 lb)    SpO2 97%    BMI 49.26 kg/m2     General appearance: alert, cooperative, no distress, appears stated age  Eyes: no sclera icterus  Lungs: clear to auscultation bilaterally  Heart: regular rate and rhythm  Abdomen: soft, non-distended. Mild RUQ tenderness. No rebound or guarding. Extremities: extremities normal, atraumatic, no cyanosis or edema. PASCALE. Skin: Skin color, texture, turgor normal. No rashes or lesions. No jaundice. Neurologic: Grossly normal    Assessment:     Gallstones   Morbid obesity (BMI 49)    Plan:     Laparoscopic cholecystectomy/IOC. Risks, benefit, and alternative to surgery was discussed with the patient. The risks of surgery include but not limited to infection, bleeding, intraabdominal organ injury, bile duct injury, retained stone, bile leak, possible conversion to open, and the risks of general anesthetic. Patient is agreeable to surgery. All questions answered.

## 2018-07-12 NOTE — IP AVS SNAPSHOT
[address] [facility] 
[phone] Patient: Yumiko San MRN: [id number] GUA:[id number] About your hospitalization You were admitted on:  July 12, 2018 You last received care in the:  [facility] You were discharged on:  July 12, 2018 Why you were hospitalized Your primary diagnosis was:  Not on File Follow-up Information Follow up With Details Comments Contact Info Armando Conrad, NP   [address] [facility] 
[phone] Your Scheduled Appointments Monday July 30, 2018  3:00 PM EDT  
POST OP with Jackie Castellanos MD  
[facility] 234 beds at the [facility] Dr. Patrick Msoley [facility] ([address] [facility]) [address] [place] [address], [address] [facility]  
[phone] Discharge Orders None A check scarlett indicates which time of day the medication should be taken. My Medications START taking these medications Instructions Each Dose to Equal  
 Morning Noon Evening Bedtime  
 ondansetron hcl 4 mg tablet Commonly known as:  Nimisha Bene Your last dose was: Your next dose is: Take 1 Tab by mouth every eight (8) hours as needed for Nausea. 4 mg CHANGE how you take these medications Instructions Each Dose to Equal  
 Morning Noon Evening Bedtime * PERCOCET 5-325 mg per tablet Generic drug:  oxyCODONE-acetaminophen What changed:  Another medication with the same name was added. Make sure you understand how and when to take each. Your last dose was: Your next dose is: Take  by mouth every four (4) hours as needed for Pain. * oxyCODONE-acetaminophen 5-325 mg per tablet Commonly known as:  PERCOCET What changed:   You were already taking a medication with the same name, and this prescription was added. Make sure you understand how and when to take each. Your last dose was: Your next dose is: Take 1-2 Tabs by mouth every four (4) hours as needed for Pain. Max Daily Amount: 12 Tabs. 1-2 Tab * Notice: This list has 2 medication(s) that are the same as other medications prescribed for you. Read the directions carefully, and ask your doctor or other care provider to review them with you. Where to Get Your Medications Information on where to get these meds will be given to you by the nurse or doctor. ! Ask your nurse or doctor about these medications  
  ondansetron hcl 4 mg tablet  
 oxyCODONE-acetaminophen 5-325 mg per tablet Opioid Education Prescription Opioids: What You Need to Know: 
 
Prescription opioids can be used to help relieve moderate-to-severe pain and are often prescribed following a surgery or injury, or for certain health conditions. These medications can be an important part of treatment but also come with serious risks. Opioids are strong pain medicines. Examples include hydrocodone, oxycodone, fentanyl, and morphine. Heroin is an example of an illegal opioid. It is important to work with your health care provider to make sure you are getting the safest, most effective care. WHAT ARE THE RISKS AND SIDE EFFECTS OF OPIOID USE? Prescription opioids carry serious risks of addiction and overdose, especially with prolonged use. An opioid overdose, often marked by slow breathing, can cause sudden death. The use of prescription opioids can have a number of side effects as well, even when taken as directed. · Tolerance-meaning you might need to take more of a medication for the same pain relief · Physical dependence-meaning you have symptoms of withdrawal when the medication is stopped.   Withdrawal symptoms can include nausea, sweating, chills, diarrhea, stomach cramps, and muscle aches. Withdrawal can last up to several weeks, depending on which drug you took and how long you took it. · Increased sensitivity to pain · Constipation · Nausea, vomiting, and dry mouth · Sleepiness and dizziness · Confusion · Depression · Low levels of testosterone that can result in lower sex drive, energy, and strength · Itching and sweating RISKS ARE GREATER WITH:      
· History of drug misuse, substance use disorder, or overdose · Mental health conditions (such as depression or anxiety) · Sleep apnea · Older age (72 years or older) · Pregnancy Avoid alcohol while taking prescription opioids. Also, unless specifically advised by your health care provider, medications to avoid include: · Benzodiazepines (such as Xanax or Valium) · Muscle relaxants (such as Soma or Flexeril) · Hypnotics (such as Ambien or Lunesta) · Other prescription opioids KNOW YOUR OPTIONS Talk to your health care provider about ways to manage your pain that don't involve prescription opioids. Some of these options may actually work better and have fewer risks and side effects. Options may include: 
· Pain relievers such as acetaminophen, ibuprofen, and naproxen · Some medications that are also used for depression or seizures · Physical therapy and exercise · Counseling to help patients learn how to cope better with triggers of pain and stress. · Application of heat or cold compress · Massage therapy · Relaxation techniques Be Informed Make sure you know the name of your medication, how much and how often to take it, and its potential risks & side effects. IF YOU ARE PRESCRIBED OPIOIDS FOR PAIN: 
· Never take opioids in greater amounts or more often than prescribed. Remember the goal is not to be pain-free but to manage your pain at a tolerable level. · Follow up with your primary care provider to: · Work together to create a plan on how to manage your pain. · Talk about ways to help manage your pain that don't involve prescription opioids. · Talk about any and all concerns and side effects. · Help prevent misuse and abuse. · Never sell or share prescription opioids · Help prevent misuse and abuse. · Store prescription opioids in a secure place and out of reach of others (this may include visitors, children, friends, and family). · Safely dispose of unused/unwanted prescription opioids: Find your community drug take-back program or your pharmacy mail-back program, or flush them down the toilet, following guidance from the Food and Drug Administration (www.fda.gov/Drugs/ResourcesForYou). · Visit www.cdc.gov/drugoverdose to learn about the risks of opioid abuse and overdose. · If you believe you may be struggling with addiction, tell your health care provider and ask for guidance or call [address] [place], [place] [address]MyScreen at 1-149-528-KZYP. Discharge Instructions Patient Discharge Instructions Rinku King / [id number] : 1990 Admitted 2018 Discharged: 2018 What to do at [facility] Recommended diet: Low fat, Low cholesterol Recommended activity: no heavy lifting > 20 lbs x 2 weeks; no driving while taking narcotics for pain. May take shower, but no bath x 2 weeks. Keep ice over the incision to minimize swelling. Please take over the counter stool softener or miralax while taking narcotics for pain. If you experience a lot of drainage, develop redness around the wound, or a fever over 101 F occurs please call the office. Narcotics and anesthesia sometimes cause nausea and vomiting. If persistent please call the office. Do not hesitate to call with questions or concerns. Follow-up with Dr. Janene Mitchell in 2 weeks. Please call [phone] for an appointment. Information obtained by : 
I understand that if any problems occur once I am at home I am to contact my physician. I understand and acknowledge receipt of the instructions indicated above. Physician's or R.N.'s Signature                                                                  Date/Time Patient or Representative Signature                                                          Date/Time DISCHARGE SUMMARY from Nurse PATIENT INSTRUCTIONS: 
 
After general anesthesia or intravenous sedation, for 24 hours or while taking prescription Narcotics: · Limit your activities · Do not drive and operate hazardous machinery · Do not make important personal or business decisions · Do  not drink alcoholic beverages · If you have not urinated within 8 hours after discharge, please contact your surgeon on call. Report the following to your surgeon: 
· Excessive pain, swelling, redness or odor of or around the surgical area · Temperature over 100.5 · Nausea and vomiting lasting longer than 4 hours or if unable to take medications · Any signs of decreased circulation or nerve impairment to extremity: change in color, persistent  numbness, tingling, coldness or increase pain · Any questions What to do at Home: *  Please give a list of your current medications to your Primary Care Provider. *  Please update this list whenever your medications are discontinued, doses are 
    changed, or new medications (including over-the-counter products) are added. *  Please carry medication information at all times in case of emergency situations. These are general instructions for a healthy lifestyle: No smoking/ No tobacco products/ Avoid exposure to second hand smoke Surgeon General's Warning:  Quitting smoking now greatly reduces serious risk to your health. Obesity, smoking, and sedentary lifestyle greatly increases your risk for illness A healthy diet, regular physical exercise & weight monitoring are important for maintaining a healthy lifestyle You may be retaining fluid if you have a history of heart failure or if you experience any of the following symptoms:  Weight gain of 3 pounds or more overnight or 5 pounds in a week, increased swelling in our hands or feet or shortness of breath while lying flat in bed. Please call your doctor as soon as you notice any of these symptoms; do not wait until your next office visit. Recognize signs and symptoms of STROKE: 
 
 
? soup 
? broth 
?  toast  
? crackers ? applesauce 
? bananas  
? mashed potatoes, 
? soft or scrambled eggs 
? oatmeal 
?  jello It is important to eat when taking your pain medication. This will help to prevent nausea. If possible, please try to time your meals with your medications. It is very important to stay hydrated following surgery. Sip fluids frequently while awake. Avoid acidic drinks such as citrus juices and soda for 24 hours. Carbonated beverages may cause bloating and gas. Acceptable fluids include: 
 
? water (flavor packets may add variety) ? coffee or tea (in moderation) ? Gatorade ? Therman [place] ? apple juice 
? cranberry juice You are encouraged to cough and deep breathe every hour when awake. This will help to prevent respiratory complications following anesthesia. You may want to hug a pillow when coughing and sneezing to add additional support to the surgical area and to decrease discomfort if you had abdominal or chest surgery. If you are discharged home with support stockings, you may remove them after 24 hours. Support stockings are used to help prevent blood clots in the legs following surgery. Please take time to review all of your Home Care Instructions and Medication Information sheets provided in your discharge packet. If you have any questions, please contact your surgeons office. Thank you. How to Care for Your Wound After Its Treated With DERMABOND* Topical Skin Adhesive DERMABOND* Topical Skin Adhesive (2-octyl cyanoacrylate) is a sterile, liquid skin adhesive 
that holds wound edges together. The film will usually remain in place for 5 to 10 days, then 
naturally fall off your skin. The following will answer some of your questions and provide instructions for proper care for your 
wound while it is healing: CHECK WOUND APPEARANCE 
 Some swelling, redness, and pain are common with all wounds and normally will go away as the 
wound heals. If swelling, redness, or pain increases or if the wound feels warm to the touch, 
contact a doctor. Also contact a doctor if the wound edges reopen or separate. REPLACE BANDAGES 
 If your wound is bandaged, keep the bandage dry.  Replace the dressing daily until the adhesive film has fallen off or if the 
bandage should become wet, unless otherwise instructed by your 
physician.  When changing the dressing, do not place tape directly over the DERMABOND adhesive film, because removing the tape later may also 
remove the film. AVOID TOPICAL MEDICATIONS  Do not apply liquid or ointment medications or any other product to your wound while the DERMABOND adhesive film is in place. These may loosen the film before your wound is healed. KEEP WOUND DRY AND PROTECTED  You may occasionally and briefly wet your wound in the shower or bath. Do not soak or scrub 
your wound, do not swim, and avoid periods of heavy perspiration until the DERMABOND 
adhesive has naturally fallen off. After showering or bathing, gently blot your wound dry with a 
soft towel. If a protective dressing is being used, apply a fresh, dry bandage, being sure to keep the tape off the DERMABOND adhesive film.  Apply a clean, dry bandage over the wound if necessary to protect it.  Protect your wound from injury until the skin has had sufficient time to heal. 
 Do not scratch, rub, or pick at the DERMABOND adhesive film. This may loosen the film before 
your wound is healed.  Protect the wound from prolonged exposure to sunlight or tanning lamps while the film is in 
place. If you have any questions or concerns about this product, please consult your doctor. *Trademark ©[employer] 2002 Narcotic-Analgesic/Acetaminophen (Percocet, Norco, Lorcet HD, Lortab 10/325) - (By mouth) Why this medicine is used:  
Relieves pain. Contact a nurse or doctor right away if you have: 
· Extreme weakness, shallow breathing, slow heartbeat · Severe confusion, lightheadedness, dizziness, fainting · Yellow skin or eyes, dark urine or pale stools · Severe constipation, severe stomach pain, nausea, vomiting, loss of appetite · Sweating or cold, clammy skin Common side effects: · Mild constipation, nausea, vomiting · Sleepiness, tiredness · Itching, rash © 2017 [address] [employer] Information is for End User's use only and may not be sold, redistributed or otherwise used for commercial purposes. Ondansetron (Zofran, Zofran ODT, Zuplenz) - (By mouth, Into the mouth) Why this medicine is used:  
Prevents nausea and vomiting. Contact a nurse or doctor right away if you have: 
· Fast, pounding, or uneven heartbeat · Lightheadedness or fainting · Trouble breathing Common side effects: 
· Headache, tiredness · Constipation, diarrhea © 2017 [address] Information is for End User's use only and may not be sold, redistributed or otherwise used for commercial purposes. Introducing [facility]!    
 [employer] introduces [employer] patient portal. Now you can access parts of your medical record, email your doctor's office, and request medication refills online. 1. In your internet browser, go to [email or web address] 2. Click on the First Time User? Click Here link in the Sign In box. You will see the New Member Sign Up page. 3. Enter your [facility] Access Code exactly as it appears below. You will not need to use this code after youve completed the sign-up process. If you do not sign up before the expiration date, you must request a new code. · [facility] Access Code: [email or web address] Expires: 10/9/2018 10:33 AM 
 
4. Enter the last four digits of your Social Security Number (xxxx) and Date of Birth (mm/dd/yyyy) as indicated and click Submit. You will be taken to the next sign-up page. 5. Create a [facility] ID. This will be your [facility] login ID and cannot be changed, so think of one that is secure and easy to remember. 6. Create a [facility] password. You can change your password at any time. 7. Enter your Password Reset Question and Answer. This can be used at a later time if you forget your password. 8. Enter your e-mail address. You will receive e-mail notification when new information is available in [address] 9. Click Sign Up. You can now view and download portions of your medical record. 10. Click the Download Summary menu link to download a portable copy of your medical information. If you have questions, please visit the Frequently Asked Questions section of the [facility] website. Remember, [facility] is NOT to be used for urgent needs. For medical emergencies, dial 911. Now available from your iPhone and Android! Introducing Zeferino Mclaughlin As a [employer] patient, I wanted to make you aware of our electronic visit tool called Zeferino Mclaughlin. [employer] 24/7 allows you to connect within minutes with a medical provider 24 hours a day, seven days a week via a mobile device or tablet or logging into a secure website from your computer. You can access [employer] from anywhere in the [place]. A virtual visit might be right for you when you have a simple condition and feel like you just dont want to get out of bed, or cant get away from work for an appointment, when your regular Karen Whittaker provider is not available (evenings, weekends or holidays), or when youre out of town and need minor care. Electronic visits cost only $49 and if the [employer]iris[employer] 24/7 provider determines a prescription is needed to treat your condition, one can be electronically transmitted to a nearby pharmacy*. Please take a moment to enroll today if you have not already done so. The enrollment process is free and takes just a few minutes. To enroll, please download the [employer] 24/7 moe to your tablet or phone, or visit [email or web address] to enroll on your computer. And, as an [address] [place] [address] patient with a iFollo account, the results of your visits will be scanned into your electronic medical record and your primary care provider will be able to view the scanned results. We urge you to continue to see your regular Karen Whittaker provider for your ongoing medical care. And while your primary care provider may not be the one available when you seek a Zeferino MobiTVolegfin virtual visit, the peace of mind you get from getting a real diagnosis real time can be priceless. For more information on [employer], view our Frequently Asked Questions (FAQs) at [email or web address]. Sincerely, 
 
Carlitos Velez MD 
Chief Medical Officer [facility] Ella Lange *:  certain medications cannot be prescribed via [employer] Providers Seen During Your Hospitalization Provider Specialty Primary office phone Virgilio Bermudez MD General Surgery [phone] Your Primary Care Physician (PCP) Primary Care Physician Office Phone Office Fax Hi GARCIA [phone] You are allergic to the following Allergen Reactions Sulfa (Sulfonamide Antibiotics) Anaphylaxis Recent Documentation Height Weight BMI OB Status Smoking Status 1.651 m 133 kg 48.79 kg/m2 Implant Never Smoker Emergency Contacts Name Discharge Info Relation Home Work Mobile Leticia Garcia  Parent [1] [phone] Garrick Paredes DISCHARGE CAREGIVER [3] Father [15]   [phone] Patient Belongings The following personal items are in your possession at time of discharge: 
  Dental Appliances: None  Visual Aid: Glasses   Hearing Aids/Status: Does not own  Home Medications: None   Jewelry: Body Piercing (ear piercings x 2)  Clothing: Shirt, Pants, Socks, Undergarments, Footwear    Other Valuables: Eyeglasses Please provide this summary of care documentation to your next provider. Signatures-by signing, you are acknowledging that this After Visit Summary has been reviewed with you and you have received a copy. Patient Signature:  ____________________________________________________________ Date:  ____________________________________________________________  
  
Isis Vazo Provider Signature:  ____________________________________________________________ Date:  ____________________________________________________________

## 2018-07-30 ENCOUNTER — OFFICE VISIT (OUTPATIENT)
Dept: SURGERY | Age: 28
End: 2018-07-30

## 2018-07-30 VITALS
TEMPERATURE: 98.2 F | DIASTOLIC BLOOD PRESSURE: 83 MMHG | BODY MASS INDEX: 48.68 KG/M2 | RESPIRATION RATE: 16 BRPM | HEART RATE: 73 BPM | HEIGHT: 65 IN | SYSTOLIC BLOOD PRESSURE: 116 MMHG | WEIGHT: 292.2 LBS | OXYGEN SATURATION: 99 %

## 2018-07-30 DIAGNOSIS — Z09 POSTOPERATIVE EXAMINATION: Primary | ICD-10-CM

## 2018-07-30 PROBLEM — E66.01 OBESITY, MORBID (HCC): Status: ACTIVE | Noted: 2018-07-30

## 2018-07-30 NOTE — PROGRESS NOTES
Ellis Jason is a 34 y.o. female     Chief Complaint   Patient presents with    Surgical Follow-up     p/o Lap Honey 7/16/18. Visit Vitals    /83 (BP 1 Location: Right arm, BP Patient Position: Sitting)    Pulse 73    Temp 98.2 °F (36.8 °C) (Oral)    Resp 16    Ht 5' 5\" (1.651 m)    Wt 292 lb 3.2 oz (132.5 kg)    SpO2 99%    BMI 48.62 kg/m2       Health Maintenance Due   Topic Date Due    HPV Age 9Y-34Y (1 of 3 - Female 3 Dose Series) 01/10/2003    DTaP/Tdap/Td series (1 - Tdap) 01/10/2013    PAP AKA CERVICAL CYTOLOGY  01/10/2013       1. Have you been to the ER, urgent care clinic since your last visit? Hospitalized since your last visit? No    2. Have you seen or consulted any other health care providers outside of the [address] [place] [address] since your last visit? Include any pap smears or colon screening.  No

## 2018-07-30 NOTE — PROGRESS NOTES
Piper Herrera is a 34 y.o. female     Chief Complaint   Patient presents with    Surgical Follow-up     p/o Lap Honey 7/16/18. Visit Vitals    Pulse 73    Temp 98.2 °F (36.8 °C) (Oral)    Resp 16    Ht 5' 5\" (1.651 m)    Wt 292 lb 3.2 oz (132.5 kg)    SpO2 99%    BMI 48.62 kg/m2       Health Maintenance Due   Topic Date Due    HPV Age 9Y-34Y (1 of 3 - Female 3 Dose Series) 01/10/2003    DTaP/Tdap/Td series (1 - Tdap) 01/10/2013    PAP AKA CERVICAL CYTOLOGY  01/10/2013       1. Have you been to the ER, urgent care clinic since your last visit? Hospitalized since your last visit? No    2. Have you seen or consulted any other health care providers outside of the [address] [place] [address] since your last visit? Include any pap smears or colon screening. No    Do you have an Advance Directive?  Yes or No

## 2018-07-30 NOTE — MR AVS SNAPSHOT
[address], [address] [address] 
[phone] Patient: Sandrita Blackburn MRN: [id number] MAR:7[birth year]01[birth year]237 Visit Information Date & Time Provider Department Dept. Phone Encounter #  
 7/30/2018  3:00 PM Anusha Garcia MD [facility] [id number] Upcoming Health Maintenance Date Due  
 HPV Age 9Y-34Y (1 of 3 - Female 3 Dose Series) 1/10/2003 DTaP/Tdap/Td series (1 - Tdap) 1/10/2013 PAP AKA CERVICAL CYTOLOGY 1/10/2013 Influenza Age 5 to Adult 8/1/2018 Allergies as of 7/30/2018  Review Complete On: 7/30/2018 By: Anusha Garcia MD  
  
 Severity Noted Reaction Type Reactions Sulfa (Sulfonamide Antibiotics) High 07/11/2018    Anaphylaxis Current Immunizations  Never Reviewed No immunizations on file. Not reviewed this visit You Were Diagnosed With   
  
 Codes Comments Postoperative examination    -  Primary ICD-10-CM: M26 ICD-9-CM: V67.00 Vitals BP Pulse Temp Resp Height(growth percentile) Weight(growth percentile) 116/83 (BP 1 Location: Right arm, BP Patient Position: Sitting) 73 98.2 °F (36.8 °C) (Oral) 16 5' 5\" (1.651 m) 292 lb 3.2 oz (132.5 kg) SpO2 BMI OB Status Smoking Status 99% 48.62 kg/m2 Implant Never Smoker Vitals History BMI and BSA Data Body Mass Index Body Surface Area  
 48.62 kg/m 2 2.47 m 2 Preferred Pharmacy Pharmacy Name Phone [id number], [address] [place] [address] [phone] Your Updated Medication List  
  
   
This list is accurate as of 7/30/18  4:40 PM.  Always use your most recent med list.  
  
  
  
  
 ondansetron hcl 4 mg tablet Commonly known as:  Stacie Frederick Take 1 Tab by mouth every eight (8) hours as needed for Nausea. * PERCOCET 5-325 mg per tablet Generic drug:  oxyCODONE-acetaminophen Take  by mouth every four (4) hours as needed for Pain. * oxyCODONE-acetaminophen 5-325 mg per tablet Commonly known as:  PERCOCET Take 1-2 Tabs by mouth every four (4) hours as needed for Pain. Max Daily Amount: 12 Tabs. * Notice: This list has 2 medication(s) that are the same as other medications prescribed for you. Read the directions carefully, and ask your doctor or other care provider to review them with you. Introducing [facility] SERVICES! Marky Torres introduces [email or web address] patient portal. Now you can access parts of your medical record, email your doctor's office, and request medication refills online. 1. In your internet browser, go to [email or web address] 2. Click on the First Time User? Click Here link in the Sign In box. You will see the New Member Sign Up page. 3. Enter your [email or web address] Access Code exactly as it appears below. You will not need to use this code after youve completed the sign-up process. If you do not sign up before the expiration date, you must request a new code. · [email or web address] Access Code: [email or web address] Expires: 10/9/2018 10:33 AM 
 
4. Enter the last four digits of your Social Security Number (xxxx) and Date of Birth (mm/dd/yyyy) as indicated and click Submit. You will be taken to the next sign-up page. 5. Create a [email or web address] ID. This will be your [email or web address] login ID and cannot be changed, so think of one that is secure and easy to remember. 6. Create a [email or web address] password. You can change your password at any time. 7. Enter your Password Reset Question and Answer. This can be used at a later time if you forget your password. 8. Enter your e-mail address. You will receive e-mail notification when new information is available in [address] 9. Click Sign Up. You can now view and download portions of your medical record. 10. Click the Download Summary menu link to download a portable copy of your medical information. If you have questions, please visit the Frequently Asked Questions section of the [employer] website. Remember, [employer] is NOT to be used for urgent needs. For medical emergencies, dial 911. Now available from your iPhone and Android! Please provide this summary of care documentation to your next provider. Your primary care clinician is listed as Nohemy Fuentes. If you have any questions after today's visit, please call [phone].

## 2018-07-30 NOTE — PROGRESS NOTES
Patient is here for follow-up. Patient underwent lap carol/IOC on 7/12/18. Doing well. No complaints. Path reviewed with the patient. PE:  Visit Vitals    Pulse 73    Temp 98.2 °F (36.8 °C) (Oral)    Resp 16    Ht 5' 5\" (1.651 m)    Wt 132.5 kg (292 lb 3.2 oz)    SpO2 99%    BMI 48.62 kg/m2     Abdomen:  [employer] C/D/I.     Assessment:  S/p lap carol/IOC    Plan:  -f/u prn

## (undated) DEVICE — SURGICAL PROCEDURE KIT GEN LAPAROSCOPY LF

## (undated) DEVICE — REM POLYHESIVE ADULT PATIENT RETURN ELECTRODE: Brand: VALLEYLAB

## (undated) DEVICE — DRAPE XR C ARM 41X74IN LF --

## (undated) DEVICE — 1200 GUARD II KIT W/5MM TUBE W/O VAC TUBE: Brand: GUARDIAN

## (undated) DEVICE — CATHETER URET 4FR L70CM POLYUR OLV FLX TIP KINK RESIST W/

## (undated) DEVICE — NEEDLE HYPO 22GA L1.5IN BLK S STL HUB POLYPR SHLD REG BVL

## (undated) DEVICE — BAG SPEC RETRV 275ML 10ML DISPOSABLE RELIACATCH

## (undated) DEVICE — Device

## (undated) DEVICE — APPLICATOR BNDG 1MM ADH PREMIERPRO EXOFIN

## (undated) DEVICE — SPONGE HEMOSTAT CELLULS 4X8IN -- SURGICEL

## (undated) DEVICE — BLADELESS OPTICAL TROCAR WITH FIXATION CANNULA: Brand: VERSAPORT

## (undated) DEVICE — KENDALL SCD EXPRESS SLEEVES, KNEE LENGTH, MEDIUM: Brand: KENDALL SCD

## (undated) DEVICE — TROCAR SITE CLOSURE DEVICE: Brand: ENDO CLOSE

## (undated) DEVICE — ELECTRODE ES 36CM LAP FLAT L HK COAT DISP CLEANCOAT

## (undated) DEVICE — SYR 10ML LUER LOK 1/5ML GRAD --

## (undated) DEVICE — BLADELESS OPTICAL TROCAR WITH FIXATION CANNULA: Brand: VERSAONE

## (undated) DEVICE — HANDLE LT SNAP ON ULT DURABLE LENS FOR TRUMPF ALC DISPOSABLE

## (undated) DEVICE — (D)PREP SKN CHLRAPRP APPL 26ML -- CONVERT TO ITEM 371833

## (undated) DEVICE — SUTURE VCRL SZ 4-0 L27IN ABSRB UD L19MM PS-2 3/8 CIR PRIM J426H

## (undated) DEVICE — SUTURE SZ 0 27IN 5/8 CIR UR-6  TAPER PT VIOLET ABSRB VICRYL J603H

## (undated) DEVICE — DEVON™ KNEE AND BODY STRAP 60" X 3" (1.5 M X 7.6 CM): Brand: DEVON

## (undated) DEVICE — STERILE POLYISOPRENE POWDER-FREE SURGICAL GLOVES: Brand: PROTEXIS

## (undated) DEVICE — STERILE POLYISOPRENE POWDER-FREE SURGICAL GLOVES WITH EMOLLIENT COATING: Brand: PROTEXIS

## (undated) DEVICE — CLIP APPLIER WITH CLIP LOGIC TECHNOLOGY: Brand: ENDO CLIP III

## (undated) DEVICE — UNIVERSAL FIXATION CANNULA: Brand: VERSAONE

## (undated) DEVICE — INFECTION CONTROL KIT SYS